# Patient Record
Sex: MALE | Race: OTHER | HISPANIC OR LATINO | URBAN - METROPOLITAN AREA
[De-identification: names, ages, dates, MRNs, and addresses within clinical notes are randomized per-mention and may not be internally consistent; named-entity substitution may affect disease eponyms.]

---

## 2023-08-25 ENCOUNTER — EMERGENCY (EMERGENCY)
Facility: HOSPITAL | Age: 58
LOS: 1 days | Discharge: HOME | End: 2023-08-25
Attending: EMERGENCY MEDICINE
Payer: COMMERCIAL

## 2023-08-25 VITALS
SYSTOLIC BLOOD PRESSURE: 123 MMHG | WEIGHT: 178.57 LBS | RESPIRATION RATE: 20 BRPM | TEMPERATURE: 99 F | OXYGEN SATURATION: 97 % | HEART RATE: 69 BPM | DIASTOLIC BLOOD PRESSURE: 74 MMHG

## 2023-08-25 LAB
ALBUMIN SERPL ELPH-MCNC: 4 G/DL — SIGNIFICANT CHANGE UP (ref 3.5–5)
ALP SERPL-CCNC: 96 U/L — SIGNIFICANT CHANGE UP (ref 40–120)
ALT FLD-CCNC: 38 U/L DA — SIGNIFICANT CHANGE UP (ref 10–60)
ANION GAP SERPL CALC-SCNC: 4 MMOL/L — LOW (ref 5–17)
APTT BLD: 34.1 SEC — SIGNIFICANT CHANGE UP (ref 24.5–35.6)
AST SERPL-CCNC: 22 U/L — SIGNIFICANT CHANGE UP (ref 10–40)
BASOPHILS # BLD AUTO: 0.04 K/UL — SIGNIFICANT CHANGE UP (ref 0–0.2)
BASOPHILS NFR BLD AUTO: 0.4 % — SIGNIFICANT CHANGE UP (ref 0–2)
BILIRUB SERPL-MCNC: 0.7 MG/DL — SIGNIFICANT CHANGE UP (ref 0.2–1.2)
BLD GP AB SCN SERPL QL: SIGNIFICANT CHANGE UP
BUN SERPL-MCNC: 10 MG/DL — SIGNIFICANT CHANGE UP (ref 7–18)
CALCIUM SERPL-MCNC: 9.1 MG/DL — SIGNIFICANT CHANGE UP (ref 8.4–10.5)
CHLORIDE SERPL-SCNC: 105 MMOL/L — SIGNIFICANT CHANGE UP (ref 96–108)
CO2 SERPL-SCNC: 28 MMOL/L — SIGNIFICANT CHANGE UP (ref 22–31)
CREAT SERPL-MCNC: 0.74 MG/DL — SIGNIFICANT CHANGE UP (ref 0.5–1.3)
EGFR: 106 ML/MIN/1.73M2 — SIGNIFICANT CHANGE UP
EOSINOPHIL # BLD AUTO: 0.05 K/UL — SIGNIFICANT CHANGE UP (ref 0–0.5)
EOSINOPHIL NFR BLD AUTO: 0.5 % — SIGNIFICANT CHANGE UP (ref 0–6)
GLUCOSE SERPL-MCNC: 107 MG/DL — HIGH (ref 70–99)
HCT VFR BLD CALC: 44 % — SIGNIFICANT CHANGE UP (ref 39–50)
HGB BLD-MCNC: 14.9 G/DL — SIGNIFICANT CHANGE UP (ref 13–17)
IMM GRANULOCYTES NFR BLD AUTO: 0.3 % — SIGNIFICANT CHANGE UP (ref 0–0.9)
INR BLD: 1.02 RATIO — SIGNIFICANT CHANGE UP (ref 0.85–1.18)
LYMPHOCYTES # BLD AUTO: 1.53 K/UL — SIGNIFICANT CHANGE UP (ref 1–3.3)
LYMPHOCYTES # BLD AUTO: 15.4 % — SIGNIFICANT CHANGE UP (ref 13–44)
MCHC RBC-ENTMCNC: 28.7 PG — SIGNIFICANT CHANGE UP (ref 27–34)
MCHC RBC-ENTMCNC: 33.9 GM/DL — SIGNIFICANT CHANGE UP (ref 32–36)
MCV RBC AUTO: 84.8 FL — SIGNIFICANT CHANGE UP (ref 80–100)
MONOCYTES # BLD AUTO: 0.8 K/UL — SIGNIFICANT CHANGE UP (ref 0–0.9)
MONOCYTES NFR BLD AUTO: 8 % — SIGNIFICANT CHANGE UP (ref 2–14)
NEUTROPHILS # BLD AUTO: 7.49 K/UL — HIGH (ref 1.8–7.4)
NEUTROPHILS NFR BLD AUTO: 75.4 % — SIGNIFICANT CHANGE UP (ref 43–77)
NRBC # BLD: 0 /100 WBCS — SIGNIFICANT CHANGE UP (ref 0–0)
PLATELET # BLD AUTO: 185 K/UL — SIGNIFICANT CHANGE UP (ref 150–400)
POTASSIUM SERPL-MCNC: 4 MMOL/L — SIGNIFICANT CHANGE UP (ref 3.5–5.3)
POTASSIUM SERPL-SCNC: 4 MMOL/L — SIGNIFICANT CHANGE UP (ref 3.5–5.3)
PROT SERPL-MCNC: 8.1 G/DL — SIGNIFICANT CHANGE UP (ref 6–8.3)
PROTHROM AB SERPL-ACNC: 11.6 SEC — SIGNIFICANT CHANGE UP (ref 9.5–13)
RBC # BLD: 5.19 M/UL — SIGNIFICANT CHANGE UP (ref 4.2–5.8)
RBC # FLD: 12.8 % — SIGNIFICANT CHANGE UP (ref 10.3–14.5)
SODIUM SERPL-SCNC: 137 MMOL/L — SIGNIFICANT CHANGE UP (ref 135–145)
WBC # BLD: 9.94 K/UL — SIGNIFICANT CHANGE UP (ref 3.8–10.5)
WBC # FLD AUTO: 9.94 K/UL — SIGNIFICANT CHANGE UP (ref 3.8–10.5)

## 2023-08-25 PROCEDURE — 80053 COMPREHEN METABOLIC PANEL: CPT

## 2023-08-25 PROCEDURE — 85025 COMPLETE CBC W/AUTO DIFF WBC: CPT

## 2023-08-25 PROCEDURE — 70450 CT HEAD/BRAIN W/O DYE: CPT | Mod: MA

## 2023-08-25 PROCEDURE — 99284 EMERGENCY DEPT VISIT MOD MDM: CPT | Mod: 25

## 2023-08-25 PROCEDURE — 85610 PROTHROMBIN TIME: CPT

## 2023-08-25 PROCEDURE — 86850 RBC ANTIBODY SCREEN: CPT

## 2023-08-25 PROCEDURE — 99284 EMERGENCY DEPT VISIT MOD MDM: CPT

## 2023-08-25 PROCEDURE — 86900 BLOOD TYPING SEROLOGIC ABO: CPT

## 2023-08-25 PROCEDURE — 85730 THROMBOPLASTIN TIME PARTIAL: CPT

## 2023-08-25 PROCEDURE — 86901 BLOOD TYPING SEROLOGIC RH(D): CPT

## 2023-08-25 PROCEDURE — 96374 THER/PROPH/DIAG INJ IV PUSH: CPT

## 2023-08-25 PROCEDURE — 70450 CT HEAD/BRAIN W/O DYE: CPT | Mod: 26,MA,77

## 2023-08-25 PROCEDURE — 70450 CT HEAD/BRAIN W/O DYE: CPT | Mod: 26,MA

## 2023-08-25 PROCEDURE — 36415 COLL VENOUS BLD VENIPUNCTURE: CPT

## 2023-08-25 RX ORDER — METOCLOPRAMIDE HCL 10 MG
10 TABLET ORAL ONCE
Refills: 0 | Status: COMPLETED | OUTPATIENT
Start: 2023-08-25 | End: 2023-08-25

## 2023-08-25 RX ORDER — ACETAMINOPHEN 500 MG
975 TABLET ORAL ONCE
Refills: 0 | Status: COMPLETED | OUTPATIENT
Start: 2023-08-25 | End: 2023-08-25

## 2023-08-25 RX ADMIN — Medication 10 MILLIGRAM(S): at 17:39

## 2023-08-25 RX ADMIN — Medication 975 MILLIGRAM(S): at 17:39

## 2023-08-25 NOTE — ED PROVIDER NOTE - NSFOLLOWUPCLINICS_GEN_ALL_ED_FT
[facility] Neurosurgery  Neurosurgery  Referral Assistance Program  [place]   Phone:   Fax:     [facility]  Neurology  [address] [place] [address]  Phone: [phone]  Fax: [phone]

## 2023-08-25 NOTE — ED PROVIDER NOTE - PATIENT PORTAL LINK FT
You can access the FollowMyHealth Patient Portal offered by [facility] by registering at the following website: [email or web address]. By joining [facility]’s FollowMyHealth portal, you will also be able to view your health information using other applications (apps) compatible with our system.

## 2023-08-25 NOTE — ED ADULT TRIAGE NOTE - CHIEF COMPLAINT QUOTE
s/p fall  last night  on the pts back yard - today severe headache / neck area , denies any n/v . NO LOC

## 2023-08-25 NOTE — ED PROVIDER NOTE - PHYSICAL EXAMINATION
Head is normocephalic. Occipital small area of scalp hematoma with localized head tenderness. No skull depression on palpation.  Pt is alert and oriented x 3, able to follow commands. No facial asymmetry. Pupils 5 mm equally round with PERRL, no nystagmus, EOM intact. Cranial nerves III-XII grossly intact. Coordination nose-to-finger intact. All limbs equally strong bilaterally 5/5. No pronator drift.  Neck is supple with full range of motion. No midline spinal tenderness to palpation. No paraspinal tenderness to palpation. Mild R SCM tenderness.  All joints full range of motion without swelling or tenderness to palpation.

## 2023-08-25 NOTE — ED PROVIDER NOTE - OBJECTIVE STATEMENT
57-year-old male, no past medical history, does not take any medication or blood thinning medication, presents for evaluation status post head injury yesterday.  While walking his dog, slipped and fell backwards landing flat on his back and hitting his head.  Did not have LOC.  Went back home and went to sleep feeling fine.  In the morning woke up feeling fine but at 10 AM started having gradual onset of progressively worsening headache localized to the occipital area.  Also reports right-sided neck pain.  Associate with mild nausea and feeling unsteady on his feet at times.  Denies any other complaints.

## 2023-08-25 NOTE — ED ADULT NURSE NOTE - NSFALLRISKINTERV_ED_ALL_ED

## 2023-08-25 NOTE — ED PROVIDER NOTE - PROGRESS NOTE DETAILS
Patient resting comfortably.  Reports significant improvement of headache.  Alert and oriented x3, GCS 15.  No focal neurodeficit on exam.  We will continue observing until neck CT. GCS 15. Patient asymptomatic. No complaints. Waiting for CT results. Signout to Dr Moreira for results/dispo. call back neurosurg to discussed repeat ct. per neurosurg, ct appear unchanged on their review. patient remains gcs 15, neuro intact. per neuro surg, patient can f.u outpatient. patient given return precautions, fu info and instructed to f,u pmd, neurovascualrly intact on dc

## 2023-08-25 NOTE — ED ADULT NURSE NOTE - OBJECTIVE STATEMENT
Pt presents to the ED with c/o headache s/p slip and fall in his backyard yesterday. Denies LOC. No vomiting.

## 2023-08-25 NOTE — ED PROVIDER NOTE - CARE PLAN
1 Principal Discharge DX:	Closed fracture of occipital bone  Secondary Diagnosis:	Occipital subdural hematoma  Secondary Diagnosis:	Subarachnoid hemorrhage

## 2023-08-25 NOTE — ED PROVIDER NOTE - NS ED ATTENDING STATEMENT MOD
This was a shared visit with the ANASTASIYA. I reviewed and verified the documentation and independently performed the documented:

## 2023-08-25 NOTE — ED PROVIDER NOTE - NSFOLLOWUPINSTRUCTIONS_ED_ALL_ED_FT
Subarachnoid Hemorrhage  Subarachnoid hemorrhage is bleeding in the area between the brain and the membrane that covers the brain (subarachnoid space). This bleeding increases the pressure on the brain and decreases blood flow to certain areas of the brain. Subarachnoid hemorrhage is a medical emergency. If this condition is not treated, it may cause permanent brain damage, stroke, or even death.    What are the causes?    This condition may be caused by:  A weakened blood vessel in the brain that bursts (ruptured brain aneurysm).  A head injury (trauma).  Bleeding from blood vessels that have developed abnormally (arteriovenous malformation).  A bleeding disorder.  Use of blood thinners (anticoagulants).  Use of certain drugs, such as cocaine.  In some cases, the cause is not known.    What increases the risk?  You are more likely to develop this condition if:  You smoke.  You have high blood pressure (hypertension).  You drink too much alcohol.  You have a family history of brain aneurysm.  You are older than age 50.  You are female, especially if you have gone through menopause.  You have a certain genetic syndrome that results in kidney disease (autosomal dominant polycystic kidney disease) or connective tissue disease.  You use cocaine.  What are the signs or symptoms?  Symptoms of this condition include:  A sudden, severe headache. The headache is often described as the worst headache ever experienced.  Nausea or vomiting, especially when combined with other symptoms such as a headache.  Changes in your mental status such as:  Loss of consciousness.  Sudden confusion.  Drowsiness.  Stiff neck.  Changes in your vision such as:  Sensitivity to light.  Sudden trouble seeing out of one or both eyes.  Sudden weakness or numbness of the face, arm, or leg, especially on one side of the body.  Sudden trouble walking or difficulty moving an arm or leg.  Trouble speaking (expressive aphasia) or understanding speech (receptive aphasia).  Difficulty swallowing.  Dizziness and loss of balance or coordination.  How is this diagnosed?  This condition is diagnosed based on a physical exam and your symptoms. You may have tests, such as:  CT scan.  MRI.  Cerebral angiogram. A procedure to examine the blood vessels in the brain and neck. For this test, a dye is injected into your bloodstream before X-rays are taken. The dye makes the blood vessels easier to see.  Lumbar puncture. A procedure to remove and examine a small amount of the fluid that surrounds the brain and spinal cord.  Blood tests.  Transcranial Doppler. A procedure that uses ultrasound to monitor blood flow in the brain.  How is this treated?  This condition often requires immediate treatment in the hospital to lower the risk of brain damage. Treatment depends on the cause, severity, and location of the bleeding and any damage that it has caused. Treatment goals are to stop bleeding, repair the cause of bleeding, relieve symptoms, and prevent problems. Treatment may include:  Medicines that:  Reverse the effects of blood thinners, if you were taking blood thinners before the subarachnoid hemorrhage.  Lower the blood pressure (antihypertensives).  Relieve pain (analgesics).  Relieve nausea or vomiting.  Prevent seizures.  Surgery to stop bleeding, repair the cause of the bleeding, or remove blood that has collected. This may involve:  A procedure that is done from inside the blood vessel, in which the aneurysm is filled with small, platinum coils (endovascular coiling).  Opening the skull (craniotomy) to reach the aneurysm and put a clip at the base of the aneurysm (surgical clipping).  Surgery to relieve pressure on the brain by placing a tube (external ventricular drain, EVD) in the brain to drain blood.  Physical, occupational, or speech-language therapy to improve any mental (cognitive) and day-to-day functions that are affected by your condition.  Other treatment depends on the cause and severity of symptoms, and how long the symptoms have lasted. Actions may be taken to prevent short-term and long-term problems, including lung infection (pneumonia) and blood clots in your legs.    Follow these instructions at home:  Medicines    Take over-the-counter and prescription medicines only as told by your health care provider.  Ask your health care provider if the medicine prescribed to you requires you to avoid driving or using machinery.  Do not take any medicines that contain aspirin or NSAIDs, such as ibuprofen, unless your health care provider approves.  Lifestyle    Rest and limit activities as directed. Rest helps the brain to heal. Make sure you:  Get plenty of sleep.  Avoid activities that cause physical or mental stress.  Do not use any products that contain nicotine or tobacco, such as cigarettes, e-cigarettes, or chewing tobacco. If you need help quitting, ask your health care provider.  General instructions    Do physical, occupational, and speech-language therapy as recommended.  Follow instructions from your health care provider about whether eating and drinking are safe for you. You may need tests to make sure that you can swallow safely (swallow studies).  Check and write down your blood pressure as told by your health care provider.  Do not drive until your health care provider says that it is safe to drive.  Keep all follow-up visits as told by your health care provider. This is important.  Where to find more information  American Stroke Association: www.stroke.org  Contact a health care provider if:  You have a stiff neck.  You have a cough.  You have a fever.  Get help right away if:  You have any symptoms of a stroke. "BE FAST" is an easy way to remember the main warning signs of a stroke:  B - Balance. Signs are dizziness, sudden trouble walking, or loss of balance.  E - Eyes. Signs are trouble seeing or a sudden change in vision.  F - Face. Signs are sudden weakness or numbness of the face, or the face or eyelid drooping on one side.  A - Arms. Signs are weakness or numbness in an arm. This happens suddenly and usually on one side of the body.  S - Speech. Signs are sudden trouble speaking, slurred speech, or trouble understanding what people say.  T - Time. Time to call emergency services. Write down what time symptoms started.  You have other signs of a stroke, such as:  A sudden, severe headache with no known cause.  Nausea or vomiting.  Seizure.  These symptoms may represent a serious problem that is an emergency. Do not wait to see if the symptoms will go away. Get medical help right away. Call your local emergency services (911 in the [place] Do not drive yourself to the hospital.    Summary  Subarachnoid hemorrhage is bleeding in the area between the brain and the membrane that covers the brain (subarachnoid space).  Subarachnoid hemorrhage is a medical emergency. Treatment may include procedures to stop bleeding or reduce pressure in the skull, and medicines to prevent complications.  Follow instructions from your health care provider about diet restrictions, activity restrictions, and medicines.  This information is not intended to replace advice given to you by your health care provider. Make sure you discuss any questions you have with your health care provider.

## 2023-08-25 NOTE — ED PROVIDER NOTE - CLINICAL SUMMARY MEDICAL DECISION MAKING FREE TEXT BOX
57-year-old male, presents for evaluation of occipital headache status post mechanical fall last night.  Nontoxic-appearing, GCS 15, no focal neurodeficit.  Ambulatory and steady gait.  CT shows occipital skull fracture with subdural and subarachnoid hemorrhage.  Discussed with on-call neurosurgery resident.  Plan for repeat 4-hour CT scan and closely monitor for any worsening of symptoms or abnormality on the exam.  Patient made aware of findings and plan.

## 2023-08-26 VITALS
DIASTOLIC BLOOD PRESSURE: 69 MMHG | RESPIRATION RATE: 19 BRPM | SYSTOLIC BLOOD PRESSURE: 135 MMHG | HEART RATE: 57 BPM | OXYGEN SATURATION: 99 % | TEMPERATURE: 98 F

## 2023-08-28 ENCOUNTER — RESULT REVIEW (OUTPATIENT)
Age: 58
End: 2023-08-28

## 2023-08-28 ENCOUNTER — APPOINTMENT (OUTPATIENT)
Dept: NEUROLOGY | Facility: CLINIC | Age: 58
End: 2023-08-28
Payer: COMMERCIAL

## 2023-08-28 VITALS
HEART RATE: 49 BPM | SYSTOLIC BLOOD PRESSURE: 146 MMHG | OXYGEN SATURATION: 98 % | HEIGHT: 63 IN | BODY MASS INDEX: 31.36 KG/M2 | WEIGHT: 177 LBS | TEMPERATURE: 98.4 F | DIASTOLIC BLOOD PRESSURE: 83 MMHG

## 2023-08-28 DIAGNOSIS — Z83.3 FAMILY HISTORY OF DIABETES MELLITUS: ICD-10-CM

## 2023-08-28 DIAGNOSIS — Z78.9 OTHER SPECIFIED HEALTH STATUS: ICD-10-CM

## 2023-08-28 DIAGNOSIS — S09.90XD UNSPECIFIED INJURY OF HEAD, SUBSEQUENT ENCOUNTER: ICD-10-CM

## 2023-08-28 DIAGNOSIS — S14.109D UNSPECIFIED INJURY AT UNSPECIFIED LVL OF CERVICAL SPINAL CORD, SUBSEQUENT ENCOUNTER: ICD-10-CM

## 2023-08-28 DIAGNOSIS — Z82.49 FAMILY HISTORY OF ISCHEMIC HEART DISEASE AND OTHER DISEASES OF THE CIRCULATORY SYSTEM: ICD-10-CM

## 2023-08-28 DIAGNOSIS — S06.6X9A TRAUMATIC SUBARACHNOID HEMORRHAGE WITH LOSS OF CONSCIOUSNESS OF UNSPECIFIED DURATION, INITIAL ENCOUNTER: ICD-10-CM

## 2023-08-28 DIAGNOSIS — S09.90XA UNSPECIFIED INJURY OF HEAD, INITIAL ENCOUNTER: ICD-10-CM

## 2023-08-28 PROBLEM — Z00.00 ENCOUNTER FOR PREVENTIVE HEALTH EXAMINATION: Status: ACTIVE | Noted: 2023-08-28

## 2023-08-28 PROCEDURE — 99496 TRANSJ CARE MGMT HIGH F2F 7D: CPT

## 2023-08-28 RX ORDER — ACETAMINOPHEN 325 MG/1
TABLET, FILM COATED ORAL
Refills: 0 | Status: ACTIVE | COMMUNITY

## 2023-08-28 RX ORDER — ACETAMINOPHEN AND CODEINE PHOSPHATE 300; 30 MG/1; MG/1
300-30 TABLET ORAL TWICE DAILY
Qty: 20 | Refills: 0 | Status: ACTIVE | COMMUNITY
Start: 2023-08-28 | End: 1900-01-01

## 2023-08-28 RX ORDER — ACETAMINOPHEN 500 MG/1
TABLET ORAL
Refills: 0 | Status: ACTIVE | COMMUNITY

## 2023-08-28 NOTE — HISTORY OF PRESENT ILLNESS
[FreeTextEntry1] : He suffered a fall on Friday, 25 August.  He fell backwards hitting his head against a hard surface.  He was seen in the emergency room the next day when he developed severe headache.  He was found to have a subdural hematoma subarachnoid hemorrhage and occipital fracture.  He was discharged home when he was stable.  After returning home he developed increasing pain in his neck and back of the head.  He has been unable to sleep.

## 2023-08-28 NOTE — PROCEDURE
[FreeTextEntry1] : I have reviewed the CT scan of the head from the hospital.  Because he fell backwards and hurt his head is likely that he also injured his neck.  A CT of the spine is necessary to investigate fracture displacement of fracture in the neck spine.  Repeat CT of the head is needed to investigate progression of subarachnoid and subdural hemorrhage that might need surgical intervention.  Because of severe generalized headache and the location of the occipital fracture, tear or thrombosis of the transverse sinus and dural vessels will be also investigated by MRI of the head and MR a of the head with MRV of the head.  I am advising Tylenol with codeine 2 times a day to help him sleep.  He a [place] collar has been given for prevention of displacement of cervical spine fracture fragments.

## 2023-08-28 NOTE — PHYSICAL EXAM
[Person] : oriented to person [Place] : oriented to place [Time] : oriented to time [Concentration Intact] : normal concentrating ability [Visual Intact] : visual attention was ~T not ~L decreased [Naming Objects] : no difficulty naming common objects [Repeating Phrases] : no difficulty repeating a phrase [Writing A Sentence] : no difficulty writing a sentence [Fluency] : fluency intact [Comprehension] : comprehension intact [Reading] : reading intact [Past History] : adequate knowledge of personal past history [Cranial Nerves Optic (II)] : visual acuity intact bilaterally,  visual fields full to confrontation, pupils equal round and reactive to light [Cranial Nerves Oculomotor (III)] : extraocular motion intact [Cranial Nerves Trigeminal (V)] : facial sensation intact symmetrically [Cranial Nerves Vestibulocochlear (VIII)] : hearing was intact bilaterally [Cranial Nerves Glossopharyngeal (IX)] : tongue and palate midline [Cranial Nerves Accessory (XI - Cranial And Spinal)] : head turning and shoulder shrug symmetric [Cranial Nerves Hypoglossal (XII)] : there was no tongue deviation with protrusion [Cranial Nerves Facial Peripheral - Right Only] : peripheral 7th nerve weakness [Cranial Nerves Right Facial: House Grade ___(1-6)] : grade II (slight, 80%) facial nerve function [Motor Tone] : muscle tone was normal in all four extremities [Motor Strength] : muscle strength was normal in all four extremities [No Muscle Atrophy] : normal bulk in all four extremities [Sensation Tactile Decrease] : light touch was intact [Abnormal Walk] : normal gait [Balance] : balance was intact [2+] : Ankle jerk left 2+ [Sclera] : the sclera and conjunctiva were normal [PERRL With Normal Accommodation] : pupils were equal in size, round, reactive to light, with normal accommodation [Extraocular Movements] : extraocular movements were intact [Outer Ear] : the ears and nose were normal in appearance [Oropharynx] : the oropharynx was normal [Neck Appearance] : the appearance of the neck was normal [Neck Cervical Mass (___cm)] : no neck mass was observed [Jugular Venous Distention Increased] : there was no jugular-venous distention [Thyroid Diffuse Enlargement] : the thyroid was not enlarged [Thyroid Nodule] : there were no palpable thyroid nodules [Auscultation Breath Sounds / Voice Sounds] : lungs were clear to auscultation bilaterally [Heart Rate And Rhythm] : heart rate was normal and rhythm regular [Heart Sounds] : normal S1 and S2 [Heart Sounds Gallop] : no gallops [Murmurs] : no murmurs [Heart Sounds Pericardial Friction Rub] : no pericardial rub [Full Pulse] : the pedal pulses are present [Edema] : there was no peripheral edema [Bowel Sounds] : normal bowel sounds [Abdomen Soft] : soft [Abdomen Tenderness] : non-tender [] : no hepato-splenomegaly [Abdomen Mass (___ Cm)] : no abdominal mass palpated [Past-pointing] : there was no past-pointing [Tremor] : no tremor present [Plantar Reflex Right Only] : normal on the right [Plantar Reflex Left Only] : normal on the left

## 2023-08-28 NOTE — REVIEW OF SYSTEMS
[Facial Weakness] : facial weakness [Dizziness] : dizziness [Lightheadedness] : lightheadedness [Negative] : Psychiatric [Confused or Disoriented] : no confusion [Memory Lapses or Loss] : no memory loss [Decr. Concentrating Ability] : no decrease in concentrating ability [Difficulty with Language] : no ~M difficulty with language [Changed Thought Patterns] : no change in thought patterns [Arm Weakness] : no arm weakness [Hand Weakness] : no hand weakness [Leg Weakness] : no leg weakness [Numbness] : no numbness [Tingling] : no tingling [Abnormal Sensation] : no abnormal sensation [Seizures] : no convulsions [Fainting] : no fainting [Vertigo] : no vertigo [Migraine Headache] : no migraine headache [Difficulty Walking] : no difficulty walking [Inability to Walk] : able to walk

## 2023-08-30 ENCOUNTER — TRANSCRIPTION ENCOUNTER (OUTPATIENT)
Age: 58
End: 2023-08-30

## 2023-09-05 ENCOUNTER — APPOINTMENT (OUTPATIENT)
Dept: MRI IMAGING | Facility: HOSPITAL | Age: 58
End: 2023-09-05

## 2023-09-05 ENCOUNTER — APPOINTMENT (OUTPATIENT)
Dept: CT IMAGING | Facility: HOSPITAL | Age: 58
End: 2023-09-05

## 2023-09-05 ENCOUNTER — OUTPATIENT (OUTPATIENT)
Dept: OUTPATIENT SERVICES | Facility: HOSPITAL | Age: 58
LOS: 1 days | End: 2023-09-05
Payer: COMMERCIAL
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
## 2023-09-05 DIAGNOSIS — S09.90XD UNSPECIFIED INJURY OF HEAD, SUBSEQUENT ENCOUNTER: ICD-10-CM

## 2023-09-05 DIAGNOSIS — S06.6X9A TRAUMATIC SUBARACHNOID HEMORRHAGE WITH LOSS OF CONSCIOUSNESS OF UNSPECIFIED DURATION, INITIAL ENCOUNTER: ICD-10-CM

## 2023-09-05 PROCEDURE — 70546 MR ANGIOGRAPH HEAD W/O&W/DYE: CPT

## 2023-09-05 PROCEDURE — 72125 CT NECK SPINE W/O DYE: CPT

## 2023-09-05 PROCEDURE — 70546 MR ANGIOGRAPH HEAD W/O&W/DYE: CPT | Mod: 26,76

## 2023-09-05 PROCEDURE — 72125 CT NECK SPINE W/O DYE: CPT | Mod: 26

## 2023-09-05 PROCEDURE — 70551 MRI BRAIN STEM W/O DYE: CPT | Mod: 26,59

## 2023-09-05 PROCEDURE — 70551 MRI BRAIN STEM W/O DYE: CPT

## 2023-09-05 PROCEDURE — 70450 CT HEAD/BRAIN W/O DYE: CPT

## 2023-09-05 PROCEDURE — 70450 CT HEAD/BRAIN W/O DYE: CPT | Mod: 26

## 2023-09-21 ENCOUNTER — APPOINTMENT (OUTPATIENT)
Dept: NEUROSURGERY | Facility: CLINIC | Age: 58
End: 2023-09-21
Payer: MEDICAID

## 2023-09-21 VITALS
TEMPERATURE: 98.2 F | HEIGHT: 63 IN | OXYGEN SATURATION: 98 % | SYSTOLIC BLOOD PRESSURE: 117 MMHG | DIASTOLIC BLOOD PRESSURE: 74 MMHG | WEIGHT: 175 LBS | BODY MASS INDEX: 31.01 KG/M2 | HEART RATE: 55 BPM

## 2023-09-21 DIAGNOSIS — S09.90XA UNSPECIFIED INJURY OF HEAD, INITIAL ENCOUNTER: ICD-10-CM

## 2023-09-21 DIAGNOSIS — G50.1 ATYPICAL FACIAL PAIN: ICD-10-CM

## 2023-09-21 DIAGNOSIS — R58 HEMORRHAGE, NOT ELSEWHERE CLASSIFIED: ICD-10-CM

## 2023-09-21 DIAGNOSIS — S02.91XA UNSPECIFIED FRACTURE OF SKULL, INITIAL ENCOUNTER FOR CLOSED FRACTURE: ICD-10-CM

## 2023-09-21 DIAGNOSIS — Z09 ENCOUNTER FOR FOLLOW-UP EXAMINATION AFTER COMPLETED TREATMENT FOR CONDITIONS OTHER THAN MALIGNANT NEOPLASM: ICD-10-CM

## 2023-09-21 DIAGNOSIS — S06.6X9A TRAUMATIC SUBARACHNOID HEMORRHAGE WITH LOSS OF CONSCIOUSNESS OF UNSPECIFIED DURATION, INITIAL ENCOUNTER: ICD-10-CM

## 2023-09-21 DIAGNOSIS — S14.109A UNSPECIFIED INJURY AT UNSPECIFIED LVL OF CERVICAL SPINAL CORD, INITIAL ENCOUNTER: ICD-10-CM

## 2023-09-21 PROCEDURE — 99203 OFFICE O/P NEW LOW 30 MIN: CPT
